# Patient Record
Sex: FEMALE | Race: WHITE | Employment: PART TIME | URBAN - METROPOLITAN AREA
[De-identification: names, ages, dates, MRNs, and addresses within clinical notes are randomized per-mention and may not be internally consistent; named-entity substitution may affect disease eponyms.]

---

## 2017-09-28 NOTE — ED INITIAL ASSESSMENT (HPI)
Patient presents with right foot pain for two days. No known injury. In the past few years she has been diagnosed with arthritis, tendonitis, and other issues related to her bones. She has taken Norco and iced it with a little relief.

## 2017-09-28 NOTE — ED PROVIDER NOTES
Patient Seen in: [facility] Emergency Department    History   Patient presents with: Foot Pain    Stated Complaint: foot pain, deny injury    HPI    28-year-old female presents emergency room with chief complaint of right foot pain.   Patient states emphyema [Other] [OTHER] Maternal Grandfather    • acute MI [Other] [OTHER] Paternal Grandfather    • hypercholesterolemia [Other] [OTHER] Father        Smoking status: Former Smoker                                                              Packs/day: 0 I discussed x-ray results with the patient. Symptoms and examination are consistent with contusion of the foot. Discussed supportive care and follow-up.   Return to ER if any change worsening symptoms and discharged in good condition      Disposition

## 2018-05-22 PROBLEM — M25.512 CHRONIC LEFT SHOULDER PAIN: Status: ACTIVE | Noted: 2018-05-22

## 2018-05-22 PROBLEM — G89.29 CHRONIC LEFT SHOULDER PAIN: Status: ACTIVE | Noted: 2018-05-22

## 2018-05-22 PROBLEM — M19.012 GLENOHUMERAL ARTHRITIS, LEFT: Status: ACTIVE | Noted: 2018-05-22

## 2018-06-12 PROBLEM — Z98.890 S/P ARTHROSCOPY OF LEFT SHOULDER: Status: ACTIVE | Noted: 2018-06-12

## 2018-12-29 NOTE — ED PROVIDER NOTES
Patient Seen in: [facility] Emergency Department    History   Patient presents with:  Abdomen/Flank Pain (GI/)  Nausea/Vomiting/Diarrhea (gastrointestinal)    Stated Complaint: Rt upper abd pain, nausea,vomiting for days    HPI    56-year-old [employer]    Positive for stated complaint: Rt upper abd pain, nausea,vomiting for days  Other systems are as noted in HPI. Constitutional and vital signs reviewed. All other systems reviewed and negative except as noted above.     Physical Exam     ED 14.2 (*)     Neutrophil Absolute Prelim 10.89 (*)     Neutrophil Absolute 10.89 (*)     All other components within normal limits   LIPASE - Normal   CBC WITH DIFFERENTIAL WITH PLATELET    Narrative:      The following orders were created for panel order CB pm    Follow-up:  Jose A Calvot  [address] [place] [address]          Pete Camara MD  [address] [place] [phone]              Medications Prescribed:  Current Discharge Medication List    START

## 2023-11-13 ENCOUNTER — HOSPITAL ENCOUNTER (EMERGENCY)
Facility: HOSPITAL | Age: 33
Discharge: HOME | End: 2023-11-13
Attending: EMERGENCY MEDICINE

## 2023-11-13 ENCOUNTER — APPOINTMENT (OUTPATIENT)
Dept: GENERAL RADIOLOGY | Facility: HOSPITAL | Age: 33
End: 2023-11-13
Attending: EMERGENCY MEDICINE

## 2023-11-13 VITALS
OXYGEN SATURATION: 100 % | SYSTOLIC BLOOD PRESSURE: 146 MMHG | DIASTOLIC BLOOD PRESSURE: 96 MMHG | HEART RATE: 85 BPM | RESPIRATION RATE: 18 BRPM

## 2023-11-13 DIAGNOSIS — S61.412A LACERATION OF LEFT HAND WITHOUT FOREIGN BODY, INITIAL ENCOUNTER: Primary | ICD-10-CM

## 2023-11-13 PROCEDURE — 12001 RPR S/N/AX/GEN/TRNK 2.5CM/<: CPT

## 2023-11-13 PROCEDURE — 99284 EMERGENCY DEPT VISIT MOD MDM: CPT

## 2023-11-13 PROCEDURE — 73130 X-RAY EXAM OF HAND: CPT | Performed by: EMERGENCY MEDICINE

## 2023-11-13 PROCEDURE — 90471 IMMUNIZATION ADMIN: CPT

## 2023-11-13 RX ORDER — LIDOCAINE HYDROCHLORIDE 10 MG/ML
1 INJECTION, SOLUTION INFILTRATION; PERINEURAL ONCE
Status: COMPLETED | OUTPATIENT
Start: 2023-11-13 | End: 2023-11-13

## 2023-11-13 NOTE — ED INITIAL ASSESSMENT (HPI)
Patient was walking dog earlier and dog pulled on leash. Laceration to back of left hand caused by door handle patient believes. Bleeding controlled. Unknown tetanus.

## 2023-11-14 NOTE — ED PROVIDER NOTES
Patient Seen in: [facility] Emergency Department      History     Chief Complaint   Patient presents with    Laceration/Abrasion     Stated Complaint: laceration to hand, pulled by dog and hit lock on door, + laceration    Subjective:   HPI    61-year-old female presents emergency room for evaluation of left hand injury. Patient states that her dog pulled her and she struck her left hand on the screen door latch. Suffered a laceration to the dorsal aspect of the left hand. Patient is right-hand dominant. Denies any other injuries. Last tetanus shot unknown.     Objective:   Past Medical History:   Diagnosis Date    Abdominal pain, unspecified site 5/4/11    Acute sinusitis, unspecified 5/4/11    Anxiety state, unspecified     Bronchitis     Cough     Depression     Extrinsic asthma, unspecified     Irritable bowel     Laboratory examination ordered as part of a routine general medical examination     Leukorrhea, not specified as infective     Other acute otitis externa 3/9/12    Ovarian cyst     Pityriasis versicolor 3/9/12    Rash and other nonspecific skin eruption 2/17/12    Routine gynecological examination     Vaginitis and vulvovaginitis, unspecified 11/22/11              Past Surgical History:   Procedure Laterality Date    FRACTURE SURGERY      left shoulder x 3    PAP SMEAR  5/18/12    SHOULDER ARTHROSCOPY Left     01/2016, 10/2016, 8/2017    TONSILLECTOMY                  Social History     Socioeconomic History    Marital status:    Tobacco Use    Smoking status: Former     Packs/day: .5     Types: Cigarettes    Smokeless tobacco: Never    Tobacco comments:     smoking for 6-7 years   Substance and Sexual Activity    Alcohol use: Yes     Comment: rarely    Drug use: Yes     Frequency: 3.0 times per week     Types: Cannabis     Comment: ocasionally marijuana   Other Topics Concern    Caffeine Concern Yes     Comment: 1-2 cans of pop daily    Exercise Yes     Comment: active at work and yoga               Review of Systems    Positive for stated complaint: laceration to hand, pulled by dog and hit lock on door, + laceration  Other systems are as noted in HPI. Constitutional and vital signs reviewed. All other systems reviewed and negative except as noted above. Physical Exam     ED Triage Vitals   BP 11/13/23 1630 (!) 159/112   Pulse 11/13/23 1630 93   Resp 11/13/23 1745 18   Temp --    Temp src --    SpO2 11/13/23 1630 99 %   O2 Device 11/13/23 1630 Aerosol mask       Current:BP (!) 146/96   Pulse 85   Resp 18   SpO2 100%         Physical Exam    GENERAL: Patient is awake, alert, well-appearing, in no acute distress. HEENT:   Scalp is atraumatic. HEART: Regular rate and rhythm, no murmurs. LUNGS: Clear to auscultation bilaterally. EXTREMITIES: No tenderness to the left humerus, elbow, forearm, wrist.  There is tenderness to the dorsal aspect of the hand with a 2.5 cm L-shaped laceration. No tenderness in the digits. Sensation intact all digits. Radial ulnar median nerve testing intact. ED Course   Labs Reviewed - No data to display          Laceration was anesthetized with lidocaine locally. The wound was cleansed and irrigated copiously. There was no visible foreign body, vessel, nerve, bone , joint space, or tendon within the wound. The wound was closed using a simple closure with 4-0 nylon. The quality of the closure was excellent           MDM        Differential diagnosis before testing includes but not limited to hand fracture, dislocation, contusion, laceration, foreign body, which is a medical condition that poses a threat to life/function  Radiographic images  I personally reviewed the radiographs and my individual interpretation shows no fracture left hand  I also reviewed the official reports that showed no fracture, dislocation or foreign body.   Small mount of gas soft tissue level of first and second metacarpal    External chart review included medical records reviewed, no Tdap on file    Medications Provided: Tdap    Course of Events during Emergency Room Visit include wound thoroughly cleansed, x-ray performed, no fracture dislocation. Laceration was repaired with suture, discussed wound care including wound check in 48 hours, suture removal 7-10 days. Instructed to alternate ibuprofen and Tylenol for pain. Elevate the extremity. Return to ER if any change or worsening symptoms. Patient did have tetanus update in the ER. Patient well-appearing agrees plan discharge good condition    Shared decision making was utilized       Discharge  I have discussed with the patient the results of test, differential diagnosis, treatment plan, warning signs and symptoms which should prompt immediate return. They expressed understanding of these instructions and agrees to the following plan provided. They were given written discharge instructions and agrees to return for any concerns and voiced understanding and all questions were answered. Note to patient: The [facility] makes medical notes like these available to patients in the interest of transparency. However, this is a medical document intended as peer to peer communication. It is written in medical language and may contain abbreviations or verbiage that are unfamiliar. It may appear blunt or direct. Medical documents are intended to carry relevant information, facts as evident, and the clinical opinion of the practitioner. Medical Decision Making      Disposition and Plan     Clinical Impression:  1.  Laceration of left hand without foreign body, initial encounter         Disposition:  Discharge  11/13/2023  6:01 pm    Follow-up:  Umesh Holt  [address] [place] [address]    Follow up in 2 day(s)            Medications Prescribed:  Discharge Medication List as of 11/13/2023  6:02 PM

## 2024-07-29 ENCOUNTER — APPOINTMENT (OUTPATIENT)
Dept: CT IMAGING | Age: 34
End: 2024-07-29
Attending: EMERGENCY MEDICINE

## 2024-07-29 ENCOUNTER — HOSPITAL ENCOUNTER (EMERGENCY)
Age: 34
Discharge: HOME | End: 2024-07-29
Attending: EMERGENCY MEDICINE

## 2024-07-29 VITALS
RESPIRATION RATE: 16 BRPM | SYSTOLIC BLOOD PRESSURE: 146 MMHG | OXYGEN SATURATION: 99 % | WEIGHT: 230 LBS | HEIGHT: 61 IN | TEMPERATURE: 99 F | DIASTOLIC BLOOD PRESSURE: 94 MMHG | HEART RATE: 88 BPM | BODY MASS INDEX: 43.43 KG/M2

## 2024-07-29 DIAGNOSIS — S20.219A CONTUSION OF CHEST WALL, UNSPECIFIED LATERALITY, INITIAL ENCOUNTER: Primary | ICD-10-CM

## 2024-07-29 DIAGNOSIS — S30.1XXA CONTUSION OF ABDOMINAL WALL, INITIAL ENCOUNTER: ICD-10-CM

## 2024-07-29 DIAGNOSIS — R19.7 DIARRHEA OF PRESUMED INFECTIOUS ORIGIN: ICD-10-CM

## 2024-07-29 LAB
ALBUMIN SERPL-MCNC: 3.6 G/DL (ref 3.4–5)
ALBUMIN/GLOB SERPL: 1 {RATIO} (ref 1–2)
ALP LIVER SERPL-CCNC: 88 U/L
ALT SERPL-CCNC: 31 U/L
ANION GAP SERPL CALC-SCNC: 5 MMOL/L (ref 0–18)
APTT PPP: 25.2 SECONDS (ref 23–36)
AST SERPL-CCNC: 15 U/L (ref 15–37)
B-HCG UR QL: NEGATIVE
BASOPHILS # BLD AUTO: 0.03 X10(3) UL (ref 0–0.2)
BASOPHILS NFR BLD AUTO: 0.3 %
BILIRUB SERPL-MCNC: 0.3 MG/DL (ref 0.1–2)
BILIRUB UR QL STRIP.AUTO: NEGATIVE
BUN BLD-MCNC: 8 MG/DL (ref 9–23)
CALCIUM BLD-MCNC: 9.3 MG/DL (ref 8.5–10.1)
CHLORIDE SERPL-SCNC: 106 MMOL/L (ref 98–112)
CLARITY UR REFRACT.AUTO: CLEAR
CO2 SERPL-SCNC: 26 MMOL/L (ref 21–32)
COLOR UR AUTO: YELLOW
CREAT BLD-MCNC: 0.84 MG/DL
EGFRCR SERPLBLD CKD-EPI 2021: 94 ML/MIN/1.73M2 (ref 60–?)
EOSINOPHIL # BLD AUTO: 0.03 X10(3) UL (ref 0–0.7)
EOSINOPHIL NFR BLD AUTO: 0.3 %
ERYTHROCYTE [DISTWIDTH] IN BLOOD BY AUTOMATED COUNT: 12.4 %
GLOBULIN PLAS-MCNC: 3.6 G/DL (ref 2.8–4.4)
GLUCOSE BLD-MCNC: 91 MG/DL (ref 70–99)
GLUCOSE UR STRIP.AUTO-MCNC: NEGATIVE MG/DL
HCT VFR BLD AUTO: 41.4 %
HGB BLD-MCNC: 14.3 G/DL
IMM GRANULOCYTES # BLD AUTO: 0.03 X10(3) UL (ref 0–1)
IMM GRANULOCYTES NFR BLD: 0.3 %
INR BLD: 1.01 (ref 0.8–1.2)
KETONES UR STRIP.AUTO-MCNC: 15 MG/DL
LEUKOCYTE ESTERASE UR QL STRIP.AUTO: NEGATIVE
LYMPHOCYTES # BLD AUTO: 2.4 X10(3) UL (ref 1–4)
LYMPHOCYTES NFR BLD AUTO: 26.2 %
MCH RBC QN AUTO: 30.1 PG (ref 26–34)
MCHC RBC AUTO-ENTMCNC: 34.5 G/DL (ref 31–37)
MCV RBC AUTO: 87.2 FL
MONOCYTES # BLD AUTO: 0.57 X10(3) UL (ref 0.1–1)
MONOCYTES NFR BLD AUTO: 6.2 %
NEUTROPHILS # BLD AUTO: 6.1 X10 (3) UL (ref 1.5–7.7)
NEUTROPHILS # BLD AUTO: 6.1 X10(3) UL (ref 1.5–7.7)
NEUTROPHILS NFR BLD AUTO: 66.7 %
NITRITE UR QL STRIP.AUTO: NEGATIVE
OSMOLALITY SERPL CALC.SUM OF ELEC: 282 MOSM/KG (ref 275–295)
PH UR STRIP.AUTO: 5.5 [PH] (ref 5–8)
PLATELET # BLD AUTO: 308 10(3)UL (ref 150–450)
POTASSIUM SERPL-SCNC: 3.9 MMOL/L (ref 3.5–5.1)
PROT SERPL-MCNC: 7.2 G/DL (ref 6.4–8.2)
PROT UR STRIP.AUTO-MCNC: NEGATIVE MG/DL
PROTHROMBIN TIME: 13.3 SECONDS (ref 11.6–14.8)
RBC # BLD AUTO: 4.75 X10(6)UL
SODIUM SERPL-SCNC: 137 MMOL/L (ref 136–145)
SP GR UR STRIP.AUTO: 1.01 (ref 1–1.03)
TROPONIN I SERPL HS-MCNC: <3 NG/L
UROBILINOGEN UR STRIP.AUTO-MCNC: 0.2 MG/DL
WBC # BLD AUTO: 9.2 X10(3) UL (ref 4–11)

## 2024-07-29 PROCEDURE — 99285 EMERGENCY DEPT VISIT HI MDM: CPT

## 2024-07-29 PROCEDURE — 93005 ELECTROCARDIOGRAM TRACING: CPT

## 2024-07-29 PROCEDURE — 99284 EMERGENCY DEPT VISIT MOD MDM: CPT

## 2024-07-29 PROCEDURE — 80053 COMPREHEN METABOLIC PANEL: CPT | Performed by: EMERGENCY MEDICINE

## 2024-07-29 PROCEDURE — 36415 COLL VENOUS BLD VENIPUNCTURE: CPT

## 2024-07-29 PROCEDURE — 81025 URINE PREGNANCY TEST: CPT

## 2024-07-29 PROCEDURE — 81001 URINALYSIS AUTO W/SCOPE: CPT | Performed by: EMERGENCY MEDICINE

## 2024-07-29 PROCEDURE — 85730 THROMBOPLASTIN TIME PARTIAL: CPT | Performed by: EMERGENCY MEDICINE

## 2024-07-29 PROCEDURE — 71275 CT ANGIOGRAPHY CHEST: CPT | Performed by: EMERGENCY MEDICINE

## 2024-07-29 PROCEDURE — 74177 CT ABD & PELVIS W/CONTRAST: CPT | Performed by: EMERGENCY MEDICINE

## 2024-07-29 PROCEDURE — 85610 PROTHROMBIN TIME: CPT | Performed by: EMERGENCY MEDICINE

## 2024-07-29 PROCEDURE — 84484 ASSAY OF TROPONIN QUANT: CPT | Performed by: EMERGENCY MEDICINE

## 2024-07-29 PROCEDURE — 93010 ELECTROCARDIOGRAM REPORT: CPT

## 2024-07-29 PROCEDURE — 85025 COMPLETE CBC W/AUTO DIFF WBC: CPT | Performed by: EMERGENCY MEDICINE

## 2024-07-29 PROCEDURE — 81015 MICROSCOPIC EXAM OF URINE: CPT | Performed by: EMERGENCY MEDICINE

## 2024-07-29 RX ORDER — BUPROPION HYDROCHLORIDE 100 MG/1
100 TABLET, EXTENDED RELEASE ORAL DAILY
COMMUNITY
Start: 2023-06-01

## 2024-07-29 RX ORDER — DEXTROAMPHETAMINE SACCHARATE, AMPHETAMINE ASPARTATE, DEXTROAMPHETAMINE SULFATE AND AMPHETAMINE SULFATE 2.5; 2.5; 2.5; 2.5 MG/1; MG/1; MG/1; MG/1
TABLET ORAL
COMMUNITY
Start: 2024-06-07
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
## 2024-07-30 LAB
ATRIAL RATE: 94 BPM
P AXIS: 28 DEGREES
P-R INTERVAL: 134 MS
Q-T INTERVAL: 364 MS
QRS DURATION: 80 MS
QTC CALCULATION (BEZET): 455 MS
R AXIS: 5 DEGREES
T AXIS: 6 DEGREES
VENTRICULAR RATE: 94 BPM

## (undated) NOTE — ED AVS SNAPSHOT
Himanshu Alcantara   MRN: [id number]    Department:  [facility] Emergency Department   Date of Visit:  12/28/2018           Disclosure     Insurance plans vary and the physician(s) referred by the ER may not be covered by your plan.  Please contact yo tell this physician (or your personal doctor if your instructions are to return to your personal doctor) about any new or lasting problems. The primary care or specialist physician will see patients referred from the [facility] Emergency Department.  Stacie Farooq

## (undated) NOTE — ED AVS SNAPSHOT
Susy Braswell   MRN: [id number]    Department:  [facility] Emergency Department   Date of Visit:  9/28/2017           Disclosure     Insurance plans vary and the physician(s) referred by the ER may not be covered by your plan.  Please con If you have been prescribed any medication(s), please fill your prescription right away and begin taking the medication(s) as directed    If the emergency physician has read X-rays, these will be re-interpreted by a radiologist.  If there is a significant

## (undated) NOTE — LETTER
Date & Time: 12/28/2018, 10:15 PM  Patient: Michelle Astorga  Encounter Provider(s):    Arabella Garcia MD       To Whom It May Concern:    Letty Krause was seen and treated in our department on 12/28/2018. She may return to work with no restrictions.   If y